# Patient Record
(demographics unavailable — no encounter records)

---

## 2024-12-05 NOTE — HISTORY OF PRESENT ILLNESS
[Disease: _____________________] : Disease: [unfilled] [T: ___] : T[unfilled] [N: ___] : N[unfilled] [M: ___] : M[unfilled] [AJCC Stage: ____] : AJCC Stage: [unfilled] [de-identified] : Right breast cancer diagnosed at the age of 43 09/10/21 Bilateral mammogram and sonogram showed extremely dense breast tissue with scattered microcalcifications throughout both breasts 2022 The patient reported discomfort in her right breast and a palpable abnormality in the outer right breast 03/24/22 Right diagnostic mammogram showed scattered microcalcifications throughout the entire right breast. Extremely dense parenchyma. BI-RADS 0.  03/24/22 Right breast sonogram revealed benign cysts and no definite abnormality in the palpable area.  BI-RADS 2. 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 04/15/22 MRI of the breasts showed extreme fibroglandular tissue, scattered enhancing nonspecific foci and a 3.2 cm nodular focal area of enhancement in the lateral posterior right breast believed to represent normal glandular tissue. No significant lymphadenopathy. BI-RADS 3 4/29/22 Right stereotactic core biopsy showed two foci of invasive ductal carcinoma, SBR 5/9 0.2 and <0.1cm, ER/[place] 90% and HER-2 (0/+1). Flat epithelial atypia, focally present  within aggregate of ectatic ducts which also show columnar cell change and columnar cell hyperplasia. 05/12/22 Left breast US showed no significant masses or suspicious calcifications, or other abnormalities are seen. Benign scattered calcifications were noted BI-RADS 2  06/14/22 Right lumpectomy # 1 showed multiple foci of invasive well differentiated ductal carcinoma, <1 mm to 5 mm (microscopic measurement), SBR 4/9. DCIS, intermediate  nuclear grade, cribriform type with necrosis. Margins free of DCIS. FEA, LCIS classic type. Right medial excision showed two foci of IDC 2 mm and 4 mm located 6 mm from  designated true margin. Right lateral excision showed IDC, well differentiated 4 mm located 2 mm from designated true margin. 0/6 LN involved.  Right lumpectomy # 2 showed multiple foci of invasive well differentiated ductal carcinoma, 1 mm to 3 mm. FEA and LCIS classic type. 06/29/22 Oncotype DX score = 0 which predicts a 3% risk of distant recurrence at 9 years with AI or Tamoxifen alone. 10/4/22 Bilateral mastectomies by Dr. Zarate with simultaneous reconstruction with expander placement by Dr. Sony Santizo. 11/7/22 Tamoxifen 20 mg po daily. 12/22 Tamoxifen dose lowered to 10 mg daily due to poor tolerability at standard dose with fatigue, brain fog, arthralgias, and hot flashes. 3/30/23 Implant exchange with Dr. Santizo. [de-identified] : Multiple foci of invasive ductal carcinoma, <1 mm to 5 mm, SBR 4/9, 0/6 SLN, ER/[place] 90%, HER-2 (0), Oncotype 0, (RR 3% at 9 years with endocrine therapy alone) [de-identified] : Amina started Tamoxifen 20 mg on 2022 and due to fatigue, brain fog and hot flashes the dose was lowered to 10 mg in .  She is starting to tolerate the tamoxifen better over time with less brain fog (90% improvement since the dose reduction), decreased hot flashes and only mild fatigue.  The hot flashes can wake her up at night, but gabapentin made her dizzy so  She is having periods every few months. She often has bloating and discomfort in her abdomen which bothers her.  She is exercising less as she has been very achy and is upset that she has gained weight. Discussed seeing a nutritionist to discuss strategies for weight control, anti-inflammatory nutrition and help with her abdominal symptoms. She is  with a 9 year-old daughter and works in [employer].  HEALTH MAINTENANCE: PCP: Hugo Miles MD GYN: Israel Olsen MD Mammogram: s/p Bilateral mastectomies Colonoscopy: 23 negative, next follow up in 5 years. 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

## 2024-12-05 NOTE — HISTORY OF PRESENT ILLNESS
[Disease: _____________________] : Disease: [unfilled] [T: ___] : T[unfilled] [N: ___] : N[unfilled] [M: ___] : M[unfilled] [AJCC Stage: ____] : AJCC Stage: [unfilled] [de-identified] : Right breast cancer diagnosed at the age of 43 09/10/21 Bilateral mammogram and sonogram showed extremely dense breast tissue with scattered microcalcifications throughout both breasts 2022 The patient reported discomfort in her right breast and a palpable abnormality in the outer right breast 03/24/22 Right diagnostic mammogram showed scattered microcalcifications throughout the entire right breast. Extremely dense parenchyma. BI-RADS 0.  03/24/22 Right breast sonogram revealed benign cysts and no definite abnormality in the palpable area.  BI-RADS 2. 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 04/15/22 MRI of the breasts showed extreme fibroglandular tissue, scattered enhancing nonspecific foci and a 3.2 cm nodular focal area of enhancement in the lateral posterior right breast believed to represent normal glandular tissue. No significant lymphadenopathy. BI-RADS 3 4/29/22 Right stereotactic core biopsy showed two foci of invasive ductal carcinoma, SBR 5/9 0.2 and <0.1cm, ER/[place] 90% and HER-2 (0/+1). Flat epithelial atypia, focally present  within aggregate of ectatic ducts which also show columnar cell change and columnar cell hyperplasia. 05/12/22 Left breast US showed no significant masses or suspicious calcifications, or other abnormalities are seen. Benign scattered calcifications were noted BI-RADS 2  06/14/22 Right lumpectomy # 1 showed multiple foci of invasive well differentiated ductal carcinoma, <1 mm to 5 mm (microscopic measurement), SBR 4/9. DCIS, intermediate  nuclear grade, cribriform type with necrosis. Margins free of DCIS. FEA, LCIS classic type. Right medial excision showed two foci of IDC 2 mm and 4 mm located 6 mm from  designated true margin. Right lateral excision showed IDC, well differentiated 4 mm located 2 mm from designated true margin. 0/6 LN involved.  Right lumpectomy # 2 showed multiple foci of invasive well differentiated ductal carcinoma, 1 mm to 3 mm. FEA and LCIS classic type. 06/29/22 Oncotype DX score = 0 which predicts a 3% risk of distant recurrence at 9 years with AI or Tamoxifen alone. 10/4/22 Bilateral mastectomies by Dr. Zarate with simultaneous reconstruction with expander placement by Dr. Sony Santizo. 11/7/22 Tamoxifen 20 mg po daily. 12/22 Tamoxifen dose lowered to 10 mg daily due to poor tolerability at standard dose with fatigue, brain fog, arthralgias, and hot flashes. 3/30/23 Implant exchange with Dr. Santizo. [de-identified] : Multiple foci of invasive ductal carcinoma, <1 mm to 5 mm, SBR 4/9, 0/6 SLN, ER/[place] 90%, HER-2 (0), Oncotype 0, (RR 3% at 9 years with endocrine therapy alone) [de-identified] : Amina started Tamoxifen 20 mg on 2022 and due to fatigue, brain fog and hot flashes the dose was lowered to 10 mg in .  She is starting to tolerate the tamoxifen better over time with less brain fog (90% improvement since the dose reduction), decreased hot flashes and only mild fatigue.  The hot flashes can wake her up at night, but gabapentin made her dizzy so  She is having periods every few months. She often has bloating and discomfort in her abdomen which bothers her.  She is exercising less as she has been very achy and is upset that she has gained weight. Discussed seeing a nutritionist to discuss strategies for weight control, anti-inflammatory nutrition and help with her abdominal symptoms. She is  with a 9 year-old daughter and works in [employer].  HEALTH MAINTENANCE: PCP: Hugo Miles MD GYN: Israel Olsen MD Mammogram: s/p Bilateral mastectomies Colonoscopy: 23 negative, next follow up in 5 years. 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

## 2024-12-05 NOTE — PHYSICAL EXAM
[Fully active, able to carry on all pre-disease performance without restriction] : Status 0 - Fully active, able to carry on all pre-disease performance without restriction [Normal] : affect appropriate [de-identified] : s/p Bilateral mastectomies with implant reconstruction. No chest wall masses or lymphadenopathy. s/p revisoin surgery, incisions healning well [de-identified] : dry scaling lesion noted on the right breast 9-10 o'clock axis 5-6 cm FN ( previously biosied with benign finding)

## 2025-01-13 NOTE — PHYSICAL EXAM
[Fully active, able to carry on all pre-disease performance without restriction] : Status 0 - Fully active, able to carry on all pre-disease performance without restriction [Normal] : affect appropriate [de-identified] : s/p Bilateral mastectomies with implant reconstruction. No chest wall masses or lymphadenopathy. s/p revisoin surgery, incisions healning well [de-identified] : dry scaling lesion noted on the right breast 9-10 o'clock axis 5-6 cm FN ( previously biosied with benign finding) English

## 2025-06-19 NOTE — HISTORY OF PRESENT ILLNESS
[Disease: _____________________] : Disease: [unfilled] [T: ___] : T[unfilled] [N: ___] : N[unfilled] [M: ___] : M[unfilled] [AJCC Stage: ____] : AJCC Stage: [unfilled] [de-identified] : Right breast cancer diagnosed at the age of 43 09/10/21 Bilateral mammogram and sonogram showed extremely dense breast tissue with scattered microcalcifications throughout both breasts 2022 The patient reported discomfort in her right breast and a palpable abnormality in the outer right breast 03/24/22 Right diagnostic mammogram showed scattered microcalcifications throughout the entire right breast. Extremely dense parenchyma. BI-RADS 0.  03/24/22 Right breast sonogram revealed benign cysts and no definite abnormality in the palpable area.  BI-RADS 2. 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 04/15/22 MRI of the breasts showed extreme fibroglandular tissue, scattered enhancing nonspecific foci and a 3.2 cm nodular focal area of enhancement in the lateral posterior right breast believed to represent normal glandular tissue. No significant lymphadenopathy. BI-RADS 3 4/29/22 Right stereotactic core biopsy showed two foci of invasive ductal carcinoma, SBR 5/9 0.2 and <0.1cm, ER/[place] 90% and HER-2 (0/+1). Flat epithelial atypia, focally present  within aggregate of ectatic ducts which also show columnar cell change and columnar cell hyperplasia. 05/12/22 Left breast US showed no significant masses or suspicious calcifications, or other abnormalities are seen. Benign scattered calcifications were noted BI-RADS 2  06/14/22 Right lumpectomy # 1 showed multiple foci of invasive well differentiated ductal carcinoma, <1 mm to 5 mm (microscopic measurement), SBR 4/9. DCIS, intermediate  nuclear grade, cribriform type with necrosis. Margins free of DCIS. FEA, LCIS classic type. Right medial excision showed two foci of IDC 2 mm and 4 mm located 6 mm from  designated true margin. Right lateral excision showed IDC, well differentiated 4 mm located 2 mm from designated true margin. 0/6 LN involved.  Right lumpectomy # 2 showed multiple foci of invasive well differentiated ductal carcinoma, 1 mm to 3 mm. FEA and LCIS classic type. 06/29/22 Oncotype DX score = 0 which predicts a 3% risk of distant recurrence at 9 years with AI or Tamoxifen alone. 10/4/22 Bilateral mastectomies by Dr. Zarate with simultaneous reconstruction with expander placement by Dr. Sony Santizo. 11/7/22 Tamoxifen 20 mg po daily. 12/22 Tamoxifen dose lowered to 10 mg daily due to poor tolerability at standard dose with fatigue, brain fog, arthralgias, and hot flashes. 3/30/23 Implant exchange with Dr. Santizo. [de-identified] : Multiple foci of invasive ductal carcinoma, <1 mm to 5 mm, SBR 4/9, 0/6 SLN, ER/[place] 90%, HER-2 (0), Oncotype 0, (RR 3% at 9 years with endocrine therapy alone) [de-identified] : Amina started Tamoxifen 20 mg on 2022 and due to fatigue, brain fog and hot flashes the dose was lowered to 10 mg in .  She is starting to tolerate the tamoxifen better over time with less brain fog (90% improvement since the dose reduction), decreased hot flashes and only mild fatigue.  The hot flashes now occur primarily at night, but they don't keep her awake. She has been getting recurrent UTIs and has been told it is due in part to dryness. She will start probiotic and cranberry pills and vaginal moisturizing. LMP 12/15/24. Her GYN has scheduled her for an endometrial biopsy as a Pap smear showed some abnormality. She is exercising less as she has been very achy and is upset that she has gained weight. Discussed seeing a nutritionist to discuss strategies for weight control, anti-inflammatory nutrition and help with her abdominal symptoms. She is  with a 10 year-old daughter and works in "[employer].  HEALTH MAINTENANCE: PCP: Hugo Miles MD GYN: Israel Olsen MD Mammogram: s/p Bilateral mastectomies Bone density:  with GYN showed mild osteopenia Colonoscopy: 23 negative, next follow up in 5 years. 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

## 2025-06-19 NOTE — PHYSICAL EXAM
[Fully active, able to carry on all pre-disease performance without restriction] : Status 0 - Fully active, able to carry on all pre-disease performance without restriction [Normal] : affect appropriate [de-identified] : s/p Bilateral mastectomies with implant reconstruction. No chest wall masses or lymphadenopathy. s/p revisoin surgery, incisions healning well [de-identified] : dry scaling lesion noted on the right breast 9-10 o'clock axis 5-6 cm FN ( previously biosied with benign finding)